# Patient Record
Sex: MALE | Race: WHITE | NOT HISPANIC OR LATINO | Employment: UNEMPLOYED | URBAN - METROPOLITAN AREA
[De-identification: names, ages, dates, MRNs, and addresses within clinical notes are randomized per-mention and may not be internally consistent; named-entity substitution may affect disease eponyms.]

---

## 2020-07-12 ENCOUNTER — HOSPITAL ENCOUNTER (EMERGENCY)
Facility: OTHER | Age: 40
Discharge: LEFT BEFORE DISCHARGE | End: 2020-07-12
Attending: EMERGENCY MEDICINE

## 2020-07-12 VITALS
DIASTOLIC BLOOD PRESSURE: 103 MMHG | BODY MASS INDEX: 23.62 KG/M2 | WEIGHT: 165 LBS | SYSTOLIC BLOOD PRESSURE: 164 MMHG | OXYGEN SATURATION: 100 % | HEIGHT: 70 IN | TEMPERATURE: 98 F | RESPIRATION RATE: 16 BRPM | HEART RATE: 63 BPM

## 2020-07-12 DIAGNOSIS — R10.32 LEFT INGUINAL PAIN: Primary | ICD-10-CM

## 2020-07-12 DIAGNOSIS — Z98.890 HISTORY OF LEFT INGUINAL HERNIA REPAIR: ICD-10-CM

## 2020-07-12 DIAGNOSIS — N50.812 LEFT TESTICULAR PAIN: ICD-10-CM

## 2020-07-12 DIAGNOSIS — Z87.19 HISTORY OF LEFT INGUINAL HERNIA REPAIR: ICD-10-CM

## 2020-07-12 PROCEDURE — 99285 EMERGENCY DEPT VISIT HI MDM: CPT | Mod: 25

## 2020-07-12 PROCEDURE — 63600175 PHARM REV CODE 636 W HCPCS: Performed by: EMERGENCY MEDICINE

## 2020-07-12 PROCEDURE — 99284 EMERGENCY DEPT VISIT MOD MDM: CPT | Mod: 25

## 2020-07-12 PROCEDURE — 96372 THER/PROPH/DIAG INJ SC/IM: CPT

## 2020-07-12 RX ORDER — KETOROLAC TROMETHAMINE 10 MG/1
10 TABLET, FILM COATED ORAL 3 TIMES DAILY PRN
Qty: 20 TABLET | Refills: 0 | Status: SHIPPED | OUTPATIENT
Start: 2020-07-12

## 2020-07-12 RX ORDER — KETOROLAC TROMETHAMINE 30 MG/ML
30 INJECTION, SOLUTION INTRAMUSCULAR; INTRAVENOUS
Status: COMPLETED | OUTPATIENT
Start: 2020-07-12 | End: 2020-07-12

## 2020-07-12 RX ADMIN — KETOROLAC TROMETHAMINE 30 MG: 30 INJECTION, SOLUTION INTRAMUSCULAR at 05:07

## 2020-07-12 RX ADMIN — KETOROLAC TROMETHAMINE 30 MG: 30 INJECTION, SOLUTION INTRAMUSCULAR at 06:07

## 2020-07-12 NOTE — ED NOTES
"Attempted to discharge pt. Pt states "can I see the Dr, I am in just as much pain as when I came in here" Dr Magana notified     "

## 2020-07-12 NOTE — ED PROVIDER NOTES
"Encounter Date: 7/12/2020    SCRIBE #1 NOTE: I, Jaksanaz Harrison, am scribing for, and in the presence of, Dr. Magana.       History     Chief Complaint   Patient presents with    Abdominal Pain     LLQ pain. Hx of 2 hernia repairs. Pt describes it as a constant sharp pain. Started 3 days ago.     Time seen by provider: 4:59 PM     This is a 40 y.o. male who presents with complaint of worsening groin pain for three days. Pt reports his pain is intermittent. He notes that coughing triggers his pain. He reports two previous hernia surgeries, with his last surgery at [facility] in [place] one year ago. He reports trouble urinating. He denies blood in urine. He reports normal BMs.  He does smoke cigarettes. He denies EtOH use. Pt also smokes marijuana and methamphetamine. He denies methamphetamine injections. He has no known drug allergies.  He denies any medical problems. He has taken probiotics, antibiotics, Ibuprofen, and Tylenol "by 10s and 12s" for his pain. He denies any kidney problems. He also reports a sharp, intermittent RLQ pain.    The history is provided by the patient.     Review of patient's allergies indicates:  No Known Allergies  History reviewed. No pertinent past medical history.  No past surgical history on file.  No family history on file.  Social History     Tobacco Use    Smoking status: Not on file   Substance Use Topics    Alcohol use: Not on file    Drug use: Not on file     Review of Systems   Constitutional: Negative for fever.   HENT: Negative for sore throat.    Eyes: Negative for visual disturbance.   Respiratory: Negative for shortness of breath.    Cardiovascular: Negative for chest pain.   Gastrointestinal: Positive for abdominal pain. Negative for nausea.   Genitourinary: Positive for difficulty urinating. Negative for hematuria.        Positive for groin pain.   Musculoskeletal: Negative for back pain.   Skin: Negative for rash.   Neurological: Negative for weakness.       Physical " Exam     Initial Vitals [07/12/20 1549]   BP Pulse Resp Temp SpO2   (!) 139/93 78 16 98.1 °F (36.7 °C) 100 %      MAP       --         Physical Exam    Nursing note and vitals reviewed.  Constitutional: He appears well-developed and well-nourished. He is not diaphoretic. No distress.   Thin male. Comfortable-appearing upon initial entry to room but during exam, pain out of proportion to physical exam findings.   HENT:   Head: Normocephalic and atraumatic.   Eyes: EOM are normal. Pupils are equal, round, and reactive to light.   Neck: Normal range of motion. Neck supple.   Abdominal:   Tenderness of LLQ without rebound or guarding. Transverse surgical scar in left superior inguinal region without underlying masses appreciable when supine or standing. Pt's compliance limits exam.   Genitourinary:    Genitourinary Comments: There is a reducible bulge at the base or the left scrotum. Remainder of scrotum and testes unremarkable.     Musculoskeletal: Normal range of motion. No tenderness or edema.   Neurological: He is alert and oriented to person, place, and time.   Skin: Skin is warm and dry.   Psychiatric: He has a normal mood and affect. His behavior is normal. Judgment and thought content normal.         ED Course   Procedures  Labs Reviewed - No data to display       Imaging Results          US Scrotum And Testicles (Final result)  Result time 07/12/20 20:40:10    Final result by Mayi Ace MD (07/12/20 20:40:10)                 Impression:      No testicular mass.    Small left hydrocele.    Possible small bilateral inguinal hernias.      Electronically signed by: Mayi Ace  Date:    07/12/2020  Time:    20:40             Narrative:    EXAMINATION:  TESTICULAR ULTRASOUND WITH DOPPLER ANALYSIS    CLINICAL HISTORY:  Left testicular pain    TECHNIQUE:  Real-time testicular ultrasound was performed. Color and pulse Doppler imaging was utilized.    COMPARISON:  None.    FINDINGS:  There are possible  small bilateral inguinal hernias.    The right testicle is homogeneous in echotexture and normal in size measuring 3.5 x 2.5 x 2.7 cm. There are no intratesticular masses. There is normal color flow seen to the right testicle.    The right epididymis appears normal.    The left testicle is homogeneous in echotexture and normal in size measuring 3.9 x 2.4 x 2.5 cm. There is normal color flow seen to the left testicle.    The left epididymis appears normal..  There is a small left hydrocele.    Color Doppler imaging demonstrates  blood flow in both testes. Pulse imaging demonstrates arterial and venous  waveforms.                                 Medical Decision Making:   History:   Old Medical Records: I decided to obtain old medical records.  ED Management:  6:15 PM  At discharge, pt requesting more pain medication, stating he is too uncomfortable to leave, now complains of pain in left testicle. He continues to have pain out of proportion in left inguinal and LLQ region, even to light touch. Left testicle is now somewhat high-riding as opposed to previous exam. Will give further Toradol and obtain scrotum US.    8:57 PM  Pt now insisting on leaving, as he wants to check on his girlfriend who presented with pregnancy complications at the same time as he did and is now admitted to the hospital. He does not want to wait for US. Pain appears to be much improved as he is comfortable in the pandya.            Scribe Attestation:   Scribe #1: I performed the above scribed service and the documentation accurately describes the services I performed. I attest to the accuracy of the note.    Attending Attestation:           Physician Attestation for Scribe:  Physician Attestation Statement for Scribe #1: I, Dr. Magana, reviewed documentation, as scribed by Jak Harrison in my presence, and it is both accurate and complete.                    Patient presents complaining of severe pain in the left lower quadrant and inguinal  region for the past few days.  He also states that he was not initially planning on coming however his significant other presented here for pregnancy-related issues and he decided to sign in due to the pain.  He states he had hernia repair about a year ago in [place] and has not had pain since then denies relief with over-the-counter medications on exam the patient has pain out of proportion to exam he is a very thin male, and when he is compliant I am able to get a good exam of the inguinal region.  He does not have any appreciable hernia in the inguinal region.  There was a transient swelling at the base of the scrotum which was soft despite his report of severe pain and was reducible.  Given Toradol and initial plan was to refer him to outpatient General surgery.  However patient at time of discharge is now complaining of severe testicle pain the left testicle is somewhat retracted although is not clear if this is a normal cremasteric reflex.  There for continued workup with ultrasound to rule out torsion.  Although patient left prior to the results this did not show acute pathology.  He received his prescription for Toradol and instructions to follow-up with General surgery at the time of leaving           Clinical Impression:     1. Left inguinal pain    2. History of left inguinal hernia repair    3. Left testicular pain                ED Disposition Condition    Eloped                           Darryn Magana II, MD  07/12/20 0151

## 2020-07-13 NOTE — ED NOTES
"Pt did not want to wait for any results, denied any vital signs. Pt stated "Im ready to go home now". MD aware  "